# Patient Record
Sex: MALE | Race: WHITE | NOT HISPANIC OR LATINO | URBAN - METROPOLITAN AREA
[De-identification: names, ages, dates, MRNs, and addresses within clinical notes are randomized per-mention and may not be internally consistent; named-entity substitution may affect disease eponyms.]

---

## 2017-04-25 ENCOUNTER — IMPORTED ENCOUNTER (OUTPATIENT)
Dept: URBAN - METROPOLITAN AREA CLINIC 9 | Facility: CLINIC | Age: 67
End: 2017-04-25

## 2018-03-26 ENCOUNTER — IMPORTED ENCOUNTER (OUTPATIENT)
Dept: URBAN - METROPOLITAN AREA CLINIC 9 | Facility: CLINIC | Age: 68
End: 2018-03-26

## 2019-03-05 ENCOUNTER — IMPORTED ENCOUNTER (OUTPATIENT)
Dept: URBAN - METROPOLITAN AREA CLINIC 9 | Facility: CLINIC | Age: 69
End: 2019-03-05

## 2019-03-18 ENCOUNTER — IMPORTED ENCOUNTER (OUTPATIENT)
Dept: URBAN - METROPOLITAN AREA CLINIC 9 | Facility: CLINIC | Age: 69
End: 2019-03-18

## 2019-08-21 NOTE — PATIENT DISCUSSION
"""S/P IOL OD: Tecnis ZCB00 10.5 (ORA) +ORA/Femto +Omidria. Continue post operative instructions and drops per schedule.  """

## 2019-09-11 NOTE — PATIENT DISCUSSION
"""S/P IOL OS: Tecnis ZCB00 8.5 +Femto +Omidria. Continue post operative instructions and drops per schedule.  """

## 2020-05-20 ENCOUNTER — IMPORTED ENCOUNTER (OUTPATIENT)
Dept: URBAN - METROPOLITAN AREA CLINIC 9 | Facility: CLINIC | Age: 70
End: 2020-05-20

## 2021-04-19 ENCOUNTER — IMPORTED ENCOUNTER (OUTPATIENT)
Dept: URBAN - METROPOLITAN AREA CLINIC 9 | Facility: CLINIC | Age: 71
End: 2021-04-19

## 2021-04-19 PROBLEM — H25.13: Noted: 2021-04-19

## 2021-04-19 PROBLEM — H35.3111: Noted: 2021-04-19

## 2021-04-19 PROBLEM — H35.3131: Noted: 2021-04-19

## 2021-07-21 NOTE — PATIENT DISCUSSION
Dry eye regiment discussed with patient. Start PF tears OU QID. Start warm compresses and lid scrubs. Discussed with patient benefits of having Lipiflow done OU informed patient will need to continue dry eye regiment. Informed patient will feel discomfort the week after Lipiflow. More information given to patient.

## 2021-10-18 ASSESSMENT — VISUAL ACUITY
OD_CC: 20/20 SN
OS_SC: 20/20 SN
OD_SC: 20/25 + SN
OS_SC: 20/20 SN
OD_SC: 20/20 SN
OD_SC: 20/20 - SN
OS_SC: 20/25 + SN
OD_SC: 20/20 SN
OD_SC: 20/25 SN
OD_SC: 20/25 -2 SN
OS_SC: 20/20 - SN
OS_CC: 20/20 SN
OS_SC: 20/20 SN
OS_SC: 20/20 - SN

## 2021-10-18 ASSESSMENT — TONOMETRY
OD_IOP_MMHG: 13
OS_IOP_MMHG: 13
OD_IOP_MMHG: 16
OD_IOP_MMHG: 13
OS_IOP_MMHG: 16
OD_IOP_MMHG: 14
OS_IOP_MMHG: 15
OS_IOP_MMHG: 14
OS_IOP_MMHG: 13
OS_IOP_MMHG: 15
OD_IOP_MMHG: 15
OD_IOP_MMHG: 14

## 2022-03-30 ENCOUNTER — ESTABLISHED PATIENT (OUTPATIENT)
Dept: URBAN - METROPOLITAN AREA CLINIC 17 | Facility: CLINIC | Age: 72
End: 2022-03-30

## 2022-03-30 DIAGNOSIS — H35.3111: ICD-10-CM

## 2022-03-30 DIAGNOSIS — H25.13: ICD-10-CM

## 2022-03-30 PROCEDURE — 92014 COMPRE OPH EXAM EST PT 1/>: CPT

## 2022-03-30 PROCEDURE — 92134 CPTRZ OPH DX IMG PST SGM RTA: CPT

## 2022-03-30 ASSESSMENT — VISUAL ACUITY
OD_SC: 20/20-1
OU_SC: 20/20
OS_SC: 20/15-1

## 2022-03-30 ASSESSMENT — TONOMETRY
OS_IOP_MMHG: 15
OD_IOP_MMHG: 13

## 2023-04-05 ENCOUNTER — ESTABLISHED PATIENT (OUTPATIENT)
Dept: URBAN - METROPOLITAN AREA CLINIC 17 | Facility: CLINIC | Age: 73
End: 2023-04-05

## 2023-04-05 DIAGNOSIS — H35.3111: ICD-10-CM

## 2023-04-05 DIAGNOSIS — H25.13: ICD-10-CM

## 2023-04-05 PROCEDURE — 92014 COMPRE OPH EXAM EST PT 1/>: CPT

## 2023-04-05 PROCEDURE — 92134 CPTRZ OPH DX IMG PST SGM RTA: CPT

## 2023-04-05 ASSESSMENT — VISUAL ACUITY
OU_SC: 20/20
OD_SC: 20/20
OS_SC: 20/20

## 2023-04-05 ASSESSMENT — TONOMETRY
OD_IOP_MMHG: 14
OS_IOP_MMHG: 12

## 2024-04-22 ENCOUNTER — ESTABLISHED PATIENT (OUTPATIENT)
Dept: URBAN - METROPOLITAN AREA CLINIC 17 | Facility: CLINIC | Age: 74
End: 2024-04-22

## 2024-04-22 DIAGNOSIS — H35.3131: ICD-10-CM

## 2024-04-22 DIAGNOSIS — H25.13: ICD-10-CM

## 2024-04-22 PROCEDURE — 92014 COMPRE OPH EXAM EST PT 1/>: CPT

## 2024-04-22 PROCEDURE — 92134 CPTRZ OPH DX IMG PST SGM RTA: CPT

## 2024-04-22 ASSESSMENT — TONOMETRY
OS_IOP_MMHG: 14
OD_IOP_MMHG: 12

## 2024-04-22 ASSESSMENT — VISUAL ACUITY
OS_SC: 20/25-
OD_SC: 20/25+

## 2025-06-04 ENCOUNTER — COMPREHENSIVE EXAM (OUTPATIENT)
Age: 75
End: 2025-06-04

## 2025-06-04 DIAGNOSIS — H25.13: ICD-10-CM

## 2025-06-04 DIAGNOSIS — H35.3131: ICD-10-CM

## 2025-06-04 PROCEDURE — 92014 COMPRE OPH EXAM EST PT 1/>: CPT

## 2025-06-04 PROCEDURE — 92134 CPTRZ OPH DX IMG PST SGM RTA: CPT
